# Patient Record
Sex: MALE | Race: WHITE
[De-identification: names, ages, dates, MRNs, and addresses within clinical notes are randomized per-mention and may not be internally consistent; named-entity substitution may affect disease eponyms.]

---

## 2021-08-21 ENCOUNTER — HOSPITAL ENCOUNTER (EMERGENCY)
Dept: HOSPITAL 41 - JD.ED | Age: 76
Discharge: NURSING, REHAB OR LONG-TERM CARE | End: 2021-08-21
Payer: MEDICARE

## 2021-08-21 DIAGNOSIS — I25.2: ICD-10-CM

## 2021-08-21 DIAGNOSIS — Z79.899: ICD-10-CM

## 2021-08-21 DIAGNOSIS — Z79.82: ICD-10-CM

## 2021-08-21 DIAGNOSIS — I47.2: Primary | ICD-10-CM

## 2021-08-21 DIAGNOSIS — Z20.822: ICD-10-CM

## 2021-08-21 DIAGNOSIS — I44.7: ICD-10-CM

## 2021-08-21 DIAGNOSIS — R55: ICD-10-CM

## 2021-08-21 DIAGNOSIS — I10: ICD-10-CM

## 2021-08-21 PROCEDURE — 85025 COMPLETE CBC W/AUTO DIFF WBC: CPT

## 2021-08-21 PROCEDURE — 96366 THER/PROPH/DIAG IV INF ADDON: CPT

## 2021-08-21 PROCEDURE — U0002 COVID-19 LAB TEST NON-CDC: HCPCS

## 2021-08-21 PROCEDURE — 99285 EMERGENCY DEPT VISIT HI MDM: CPT

## 2021-08-21 PROCEDURE — 96365 THER/PROPH/DIAG IV INF INIT: CPT

## 2021-08-21 PROCEDURE — 80053 COMPREHEN METABOLIC PANEL: CPT

## 2021-08-21 PROCEDURE — 84484 ASSAY OF TROPONIN QUANT: CPT

## 2021-08-21 PROCEDURE — 83735 ASSAY OF MAGNESIUM: CPT

## 2021-08-21 PROCEDURE — 85379 FIBRIN DEGRADATION QUANT: CPT

## 2021-08-21 PROCEDURE — 36415 COLL VENOUS BLD VENIPUNCTURE: CPT

## 2021-08-21 PROCEDURE — 93005 ELECTROCARDIOGRAM TRACING: CPT

## 2021-08-21 PROCEDURE — 96368 THER/DIAG CONCURRENT INF: CPT

## 2021-08-21 PROCEDURE — 71275 CT ANGIOGRAPHY CHEST: CPT

## 2021-08-21 RX ADMIN — Medication PRN ML: at 20:10

## 2021-08-21 RX ADMIN — Medication PRN ML: at 20:57
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
## 2021-08-22 NOTE — CT
CT chest

 

Technique: Multiple axial sections through the chest were obtained.  

Intravenous contrast was utilized.  Study has been performed as a 

pulmonary angiogram protocol.

 

Comparison: No prior chest imaging is available.

 

Findings: Pulmonary arteries are fairly well opacified.  No filling 

defects are seen to indicate pulmonary embolism.

 

Thoracic aorta shows atherosclerotic calcification with no aneurysm.  

Mediastinum shows no adenopathy.  No axillary adenopathy is seen.  

Soft tissue finding is seen posterior to the superior vena cava most 

likely representing a small vascular anomaly measuring 3.4 cm.

 

No pericardial thickening is seen.  Heart may be minimally enlarged.  

Increased density is seen within the gallbladder presumably 

representing gallstones.  Parapelvic cyst is partially seen within the

 right kidney.

 

Minimal hiatal hernia is noted.

 

Lung window settings were reviewed which show mild interstitial change

 which is most likely due to mild fibrosis.  No acute parenchymal 

change is definitely appreciated.

 

Bone window settings were reviewed.  Scattered degenerative change is 

seen within the spine.  No acute osseous finding is appreciated.

 

Impression:

1.  No findings of pulmonary embolism.

2.  Other findings as described above are believed to be nonacute.  

 

Diagnostic code #2

 

I agree with preliminary report from [facility], finalized on 08/21/21, 

10:26 PM CDT, Code 1

## 2021-09-02 ENCOUNTER — HOSPITAL ENCOUNTER (EMERGENCY)
Dept: HOSPITAL 41 - JD.ED | Age: 76
Discharge: HOME | End: 2021-09-02
Payer: MEDICARE

## 2021-09-02 DIAGNOSIS — R00.1: Primary | ICD-10-CM

## 2021-09-02 DIAGNOSIS — I25.10: ICD-10-CM

## 2021-09-02 DIAGNOSIS — R06.02: ICD-10-CM

## 2021-09-02 DIAGNOSIS — Z79.899: ICD-10-CM

## 2021-09-02 DIAGNOSIS — T46.2X5A: ICD-10-CM

## 2021-09-02 DIAGNOSIS — Z95.5: ICD-10-CM

## 2021-09-02 DIAGNOSIS — K21.9: ICD-10-CM

## 2021-09-02 DIAGNOSIS — I25.2: ICD-10-CM

## 2021-09-02 DIAGNOSIS — Z79.82: ICD-10-CM

## 2021-09-02 DIAGNOSIS — I10: ICD-10-CM

## 2021-09-02 PROCEDURE — 85730 THROMBOPLASTIN TIME PARTIAL: CPT

## 2021-09-02 PROCEDURE — 93005 ELECTROCARDIOGRAM TRACING: CPT

## 2021-09-02 PROCEDURE — 83735 ASSAY OF MAGNESIUM: CPT

## 2021-09-02 PROCEDURE — 85610 PROTHROMBIN TIME: CPT

## 2021-09-02 PROCEDURE — 71045 X-RAY EXAM CHEST 1 VIEW: CPT

## 2021-09-02 PROCEDURE — 99284 EMERGENCY DEPT VISIT MOD MDM: CPT

## 2021-09-02 PROCEDURE — 86140 C-REACTIVE PROTEIN: CPT

## 2021-09-02 PROCEDURE — 83880 ASSAY OF NATRIURETIC PEPTIDE: CPT

## 2021-09-02 PROCEDURE — 82553 CREATINE MB FRACTION: CPT

## 2021-09-02 PROCEDURE — 84484 ASSAY OF TROPONIN QUANT: CPT

## 2021-09-02 PROCEDURE — 85025 COMPLETE CBC W/AUTO DIFF WBC: CPT

## 2021-09-02 PROCEDURE — 36415 COLL VENOUS BLD VENIPUNCTURE: CPT

## 2021-09-02 PROCEDURE — 80053 COMPREHEN METABOLIC PANEL: CPT

## 2021-09-02 NOTE — EDM.PDOC
ED HPI GENERAL MEDICAL PROBLEM





- General


Chief Complaint: Cardiovascular Problem


Stated Complaint: HEART RATE IS DROPPING


Time Seen by Provider: 09/02/21 11:16


Source of Information: Reports: Patient, Family (Spouse)


History Limitations: Reports: No Limitations





- History of Present Illness


INITIAL COMMENTS - FREE TEXT/NARRATIVE: 


76-year-old male presents to the ED due to bradycardia.  Patient had 2 stents 

placed in the left anterior descending coronary artery by Dr. Joe 

cardiologist at [facility] in [place] on August 21.  Patient had a short 

run of V. tach while in hospital here ,August 20 which resolved on its own.  

Patient was to go to cardiac rehab first day today but as soon as they 

identified the bradycardia they sent him to the ED.  Patient was mildly lig

htheaded and dizzy with heart rates in the 30s.  He reduced his amiodarone today

from 400 mg daily to 200 mg daily which is the first day of treatment.  He is 

also on metoprolol succinate 50 mg daily.  He denies any chest pain or dyspnea. 

He overall feels well.  Of note the patient has any cardiac defibrillator vest 

which I have not seen before with pads bilateral lower back inferior to the 

scapula and a pad inferior to the left breast in a belt-like fashion.  

Apparently this was an option versus having a defibrillator pacemaker inserted. 

Patient denies any productive cough or sputum production.





Onset: Today (Heart rate in the 30s first noted today.  Blood pressure is 

actually been going up a bit over the last week.)


Onset Date: 09/02/21 (Bradycardia first noted today.)


Duration: Hour(s):, Waxing/Waning (Heart rate is staying primarily low 

bradycardia 52 to 54/min.)


Location: Reports: Other (Low heart rate i.e. bradycardia as low as 30/min.)


Quality: Reports: Other ([Low heart rate I bradycardia in the 30s.)


Improves with: Reports: Other (Proved spontaneously particularly at rest)


Worsens with: Reports: Other (Is worse with exertion and standing)


Associated Symptoms: Reports: Malaise, Shortness of Breath, Weakness (Mild since

 the MI.).  Denies: Confusion, Chest Pain, Cough, cough w sputum, Diaphoresis, 

Fever/Chills, Headaches, Loss of Appetite, Nausea/Vomiting, Rash, Seizure, 

Syncope (Mild)


Treatments PTA: Reports: Other (see below) (None.)





- Related Data


                                    Allergies











Allergy/AdvReac Type Severity Reaction Status Date / Time


 


No Known Allergies Allergy   Verified 09/02/21 11:04











Home Meds: 


                                    Home Meds





Aspirin 325 mg PO DAILY 08/21/21 [History]


Lisinopril/Hydrochlorothiazide [Lisinopril-HCTZ 10-12.5 MG] 2 tab PO DAILY 

08/21/21 [History]


Loratadine [Claritin] 10 mg PO DAILY PRN 08/21/21 [History]


Metoprolol Succinate 50 mg PO BID 08/21/21 [History]


Omeprazole 40 mg PO DAILY 08/21/21 [History]


Simvastatin [Zocor] 40 mg PO DAILY 08/21/21 [History]


flaxseed oiL [Flaxseed Oil] 2,400 mg PO BID 08/21/21 [History]


Metoprolol Succinate 25 mg PO DAILY #30 tab.er.24h 09/02/21 [Rx]











Past Medical History


HEENT History: Reports: Hard of Hearing, Impaired Vision


Cardiovascular History: Reports: Arrhythmia (History of recurrent bouts of V. 

tach.), CAD, Hypertension, MI, Stents (2 stents were placed August 21, 2021 in 

the left anterior descending artery.  Previous myocardial infarction in the 

inferior wall with an angioplasty performed 10 years ago.  No stent was placed 

at that time), Other (See Below)


Other Cardiovascular History: Vtach


Gastrointestinal History: Reports: Chronic Constipation, GERD (Patient problems 

with constipation.)


Genitourinary History: Reports: BPH





- Past Surgical History


GI Surgical History: Reports: Appendectomy, Hernia, Inguinal





Social & Family History





- Living Situation & Occupation


Living situation: Reports: , with Spouse


Occupation: Retired





ED ROS GENERAL





- Review of Systems


Review Of Systems: See Below


Constitutional: Reports: Malaise, Fatigue, Decreased Appetite.  Denies: Fever, 

Chills, Weight Loss


HEENT: Reports: Glasses (And a decreased appetite.)


Respiratory: Reports: Shortness of Breath, Cough.  Denies: Wheezing, Pleuritic 

Chest Pain, Sputum, Hemoptysis (Productive cough intermittently.)


Cardiovascular: Reports: Blood Pressure Problem (Pressures been gradually), 

Lightheadedness (Today with low heart rate).  Denies: Chest Pain, Claudication (

going up the last week or so.), Dyspnea on Exertion, Edema, Orthopnea, 

Palpitations


Endocrine: Reports: Fatigue


GI/Abdominal: Reports: Decreased Appetite


: Reports: Frequency, Other (Nocturia usually x1 or 2.)


Musculoskeletal: Reports: Joint Pain (Right his knees hips low back neck at 

times)


Skin: Reports: Bruising (Is is easily.)


Neurological: Reports: No Symptoms.  Denies: Confusion, Dizziness, Headache, 

Numbness, Syncope, Tingling, Difficulty Walking, Weakness


Psychiatric: Reports: No Symptoms


Hematologic/Lymphatic: Reports: No Symptoms


Immunologic: Reports: No Symptoms





ED EXAM, GENERAL





- Physical Exam


Exam: See Below


Exam Limited By: No Limitations


General Appearance: Alert, WD/WN, Anxious, Mild Distress, Other (Temperature is 

36.9 degrees.  Heart rate is 54 and sinus bradycardia respiratory to 16 with O2 

sats of 90% room air.  /85.)


Eye Exam: Bilateral Eye: Normal Inspection (No blepharal pallor or scleral 

icterus.), PERRL


Throat/Mouth: Normal Inspection, Normal Lips, Normal Oropharynx


Head: Atraumatic, Normocephalic


Neck: Normal Inspection, Supple, Non-Tender, Full Range of Motion.  No: Carotid 

Bruit, Lymphadenopathy (L), Lymphadenopathy (R)


Respiratory/Chest: No Respiratory Distress, Lungs Clear, Normal Breath Sounds, 

No Accessory Muscle Use


Cardiovascular: No Edema, No Gallop, No Murmur, No Rub, Bradycardia (Rate was 

48/min when I was in the room.).  No: Normal Peripheral Pulses, Regular Rate, 

Rhythm


Peripheral Pulses: 2+: Carotid (L), Carotid (R), Posterior Tibial (L), Posterior

Tibial (R), Dorsalis Pedis (L), Dorsalis Pedis (R)


GI/Abdominal: Normal Bowel Sounds, Soft, Non-Tender, No Organomegaly, No Mass, 

Pelvis Stable


Back Exam: Normal Inspection, Full Range of Motion.  No: CVA Tenderness (L), CVA

Tenderness (R)


Extremities: Normal Inspection, Normal Range of Motion, Non-Tender, No Pedal 

Edema


Neurological: Alert, Oriented, CN II-XII Intact, Normal Cognition


Psychiatric: Normal Affect, Anxious


Skin Exam: Warm, Dry, Intact (Mildly anxious), Normal Color, No Rash


  ** #1 Interpretation


EKG Date: 09/02/21


Time: 10:57


Rhythm: Other (Sinus bradycardia)


Rate (Beats/Min): 53


Axis: LAD-Left Axis Deviation (-30 degrees)


P-Wave: Present (With first-degree AV block)


QRS: LBBB


QT: Prolonged (Left bundle branch block pattern moderately prolonged)


EKG Interpretation Comments: 





Abnormal ECG





Course





- Vital Signs


Last Recorded V/S: 


                                Last Vital Signs











Temp  36.9 C   09/02/21 10:58


 


Pulse  56 L  09/02/21 10:58


 


Resp  16   09/02/21 10:58


 


BP  144/85 H  09/02/21 10:58


 


Pulse Ox  98   09/02/21 10:58














- Orders/Labs/Meds


Labs: 


                                Laboratory Tests











  09/02/21 09/02/21 09/02/21 Range/Units





  11:00 11:00 11:00 


 


WBC  8.48    (4.23-9.07)  K/mm3


 


RBC  5.07    (4.63-6.08)  M/mm3


 


Hgb  16.1    (13.7-17.5)  gm/dl


 


Hct  49.6    (40.1-51.0)  %


 


MCV  97.8 H    (79.0-92.2)  fl


 


MCH  31.8    (25.7-32.2)  pg


 


MCHC  32.5    (32.2-35.5)  g/dl


 


RDW Std Deviation  45.7 H    (35.1-43.9)  fL


 


Plt Count  326  D    (163-337)  K/mm3


 


MPV  10.1    (9.4-12.3)  fl


 


Neut % (Auto)  60.1    (34.0-67.9)  %


 


Lymph % (Auto)  24.2    (21.8-53.1)  %


 


Mono % (Auto)  12.9 H    (5.3-12.2)  %


 


Eos % (Auto)  1.2    (0.8-7.0)  


 


Baso % (Auto)  0.5    (0.1-1.2)  %


 


Neut # (Auto)  5.11    (1.78-5.38)  K/mm3


 


Lymph # (Auto)  2.05    (1.32-3.57)  K/mm3


 


Mono # (Auto)  1.09 H    (0.30-0.82)  K/mm3


 


Eos # (Auto)  0.10    (0.04-0.54)  K/mm3


 


Baso # (Auto)  0.04    (0.01-0.08)  K/mm3


 


PT   11.9   (9.7-12.0)  SECONDS


 


INR   1.11   


 


APTT     (21.7-31.4)  SECONDS


 


Sodium    141  (136-145)  mEq/L


 


Potassium    4.2  (3.5-5.1)  mEq/L


 


Chloride    104  ()  mEq/L


 


Carbon Dioxide    29  (21-32)  mEq/L


 


Anion Gap    12.2  (5-15)  


 


BUN    27 H  (7-18)  mg/dL


 


Creatinine    1.2  (0.7-1.3)  mg/dL


 


Est Cr Clr Drug Dosing    66.31  mL/min


 


Estimated GFR (MDRD)    59  (>60)  mL/min


 


BUN/Creatinine Ratio    22.5 H  (14-18)  


 


Glucose    71  (70-99)  mg/dL


 


Calcium    8.7  (8.5-10.1)  mg/dL


 


Magnesium    2.2  (1.8-2.4)  mg/dL


 


Total Bilirubin    1.2 H  (0.2-1.0)  mg/dL


 


AST    9 L  (15-37)  U/L


 


ALT    28  (16-63)  U/L


 


Alkaline Phosphatase    61  ()  U/L


 


CK-MB (CK-2)    1.2  (0-3.6)  ng/ml


 


Troponin I    < 0.017  (0.00-0.056)  ng/mL


 


C-Reactive Protein    <0.2  (<1.0)  mg/dL


 


NT-Pro-B Natriuret Pep     (0-125)  pg/mL


 


Total Protein    7.5  (6.4-8.2)  g/dl


 


Albumin    3.7  (3.4-5.0)  g/dl


 


Globulin    3.8  gm/dL


 


Albumin/Globulin Ratio    1.0  (1-2)  














  09/02/21 09/02/21 Range/Units





  11:00 11:00 


 


WBC    (4.23-9.07)  K/mm3


 


RBC    (4.63-6.08)  M/mm3


 


Hgb    (13.7-17.5)  gm/dl


 


Hct    (40.1-51.0)  %


 


MCV    (79.0-92.2)  fl


 


MCH    (25.7-32.2)  pg


 


MCHC    (32.2-35.5)  g/dl


 


RDW Std Deviation    (35.1-43.9)  fL


 


Plt Count    (163-337)  K/mm3


 


MPV    (9.4-12.3)  fl


 


Neut % (Auto)    (34.0-67.9)  %


 


Lymph % (Auto)    (21.8-53.1)  %


 


Mono % (Auto)    (5.3-12.2)  %


 


Eos % (Auto)    (0.8-7.0)  


 


Baso % (Auto)    (0.1-1.2)  %


 


Neut # (Auto)    (1.78-5.38)  K/mm3


 


Lymph # (Auto)    (1.32-3.57)  K/mm3


 


Mono # (Auto)    (0.30-0.82)  K/mm3


 


Eos # (Auto)    (0.04-0.54)  K/mm3


 


Baso # (Auto)    (0.01-0.08)  K/mm3


 


PT    (9.7-12.0)  SECONDS


 


INR    


 


APTT  26.7   (21.7-31.4)  SECONDS


 


Sodium    (136-145)  mEq/L


 


Potassium    (3.5-5.1)  mEq/L


 


Chloride    ()  mEq/L


 


Carbon Dioxide    (21-32)  mEq/L


 


Anion Gap    (5-15)  


 


BUN    (7-18)  mg/dL


 


Creatinine    (0.7-1.3)  mg/dL


 


Est Cr Clr Drug Dosing    mL/min


 


Estimated GFR (MDRD)    (>60)  mL/min


 


BUN/Creatinine Ratio    (14-18)  


 


Glucose    (70-99)  mg/dL


 


Calcium    (8.5-10.1)  mg/dL


 


Magnesium    (1.8-2.4)  mg/dL


 


Total Bilirubin    (0.2-1.0)  mg/dL


 


AST    (15-37)  U/L


 


ALT    (16-63)  U/L


 


Alkaline Phosphatase    ()  U/L


 


CK-MB (CK-2)    (0-3.6)  ng/ml


 


Troponin I    (0.00-0.056)  ng/mL


 


C-Reactive Protein    (<1.0)  mg/dL


 


NT-Pro-B Natriuret Pep   212 H  (0-125)  pg/mL


 


Total Protein    (6.4-8.2)  g/dl


 


Albumin    (3.4-5.0)  g/dl


 


Globulin    gm/dL


 


Albumin/Globulin Ratio    (1-2)  











Meds: 


Medications














Discontinued Medications














Generic Name Dose Route Start Last Admin





  Trade Name Freq  PRN Reason Stop Dose Admin


 


Sodium Chloride  1,000 mls @ 100 mls/hr  09/02/21 11:30  09/02/21 12:32





  Normal Saline  IV   100 mls/hr





  ASDIRECTED [facility]   Administration














- Radiology Interpretation


Free Text/Narrative:: 


76-year-old male presents to the ED in the accompaniment of his wife. Patient 

was supposed to attend cardiac rehab for the first time today. They appreciated 

his rate was in the 30s while upon arrival. He was thus sent to the ED. Wife 

reports heart rate has been as low as 30 and was 38 when I first visit with the 

patient and since then it has been staying in the mid to lower 50s. Patient had 

a run of V. tach with suspected myocardial infarction on August 20. He was seen 

in [place] and had 2 stents placed in his left anterior descending artery 

August 21. Due to runs of V. tach he is currently on a defibrillator vest which 

I have not seen before. Apparently he was not sure he wanted to proceed with a 

defibrillator pacemaker insertion. He has recently reduced his amiodarone from 

400 mg daily to 200 mg daily and today is the first day that this is occurred. 

He is on metoprolol succinate 50 mg daily as well. He denies chest pain feels 

mildly short of breath mildly lightheaded at times but not orthostatic. In fact 

blood pressure if anything is been going up a bit the last few days. Plan ECG. 

Portable chest x-ray. Routine labs to be done.








- Re-Assessments/Exams


Free Text/Narrative Re-Assessment/Exam: 





09/02/21 12:30: Moderate cardiomegaly with prominent tortuous thoracic aorta. 

Minimal linear density within the right lung base is most likely chronic. He has

a dromedary hump of the right hemidiaphragm. Lungs otherwise are clear. Bony 

structures showed degenerative change within the thoracic spine. No acute 

osseous abnormalities appreciated.





09/02/21 12:47 White count is 8.48 with 60% neutrophils on the auto 

differential. Hemoglobin is 16.1 with hematocrit of 49.6. MCV is 97.8. Platelet 

count 326,000. PT is 11.9 with an INR of 1.11. PTT is 26.7. Sodium 141 with a 

potassium of 4.2. Chloride 104 the bicarb of 29. Anion gap is 12.2. BUN is 27 

with a creatinine of 1.2 and a GFR of 59. Glucose is low at 71. Calcium 8.7 with

a magnesium of 2.2. Total bilirubin is 1.2 with an AST of 9 and an ALT of 28. 

Alkaline phosphatase is 61. CK-MB fraction is 1.2 troponin I is less than 0.017.

C-reactive protein less than 0.2. BNP is 212. Total protein is 7.5 with an 

albumin fraction of 3.7 


09/02/21 13:00: I did speak with on-call cardiologist Dr. Way and he agrees 

that the patient requires a reduction in dosage of medication.  Since his 

amiodarone was reduced just today to 200 mg twice daily from 400 mg twice daily 

after receiving adequate loading dose that we will in turn reduce his metoprolol

succinate from 50 mg to 25 mg once daily starting tomorrow morning.  They have a

follow-up appointment with Dr. Joe next week.














Departure





- Departure


Time of Disposition: 13:31


Disposition: Home, Self-Care 01


Reason for Transfer *Q: Other


Condition: Fair


Clinical Impression: 


 Symptomatic bradycardia





Adverse effects of medication


Qualifiers:


 Encounter type: initial encounter Qualified Code(s): T50.905A - Adverse effect 

of unspecified drugs, medicaments and biological substances, initial encounter





Prescriptions: 


Metoprolol Succinate 25 mg PO DAILY #30 tab.er.24h


Instructions:  Bradycardia, Adult


Referrals: 


Gerber Miller MD [Primary Care Provider] - 


Forms:  ED Department Discharge


Additional Instructions: 


Evaluation in the emergency room today in regards to presentation to cardiac 

rehab with identification of a very low heart rate in the low 30s.  While you 

were in the emergency room your heart rate is staying in the mid 50s which is 

kind of where we would like it usually between 55 and 65 bpm.  Lab work done 

through the emergency room was all within normal limits today showing a normal 

magnesium and potassium level.  Chest x-ray will also was essentially normal.  

Decision made after speaking with cardiology in [facility] in [place] is 

to reduce your metoprolol succinate from 50 mg daily to 25 mg daily and let your

heart rate come back up.  Of note this will take at least 3 to 4 days for the 

higher dose to wear off.  Therefore take life easy over the next 3 to 4 days 

with no prolonged standing which would place you at risk of a fall.  Maintain 

adequate hydration as well.  Start the metoprolol 25 mg succinate tablet 

tomorrow morning.  Continue with your amiodarone reduction to 200 mg tablet 

twice daily as planned.

## 2021-09-02 NOTE — CR
Chest: Frontal view of the chest was obtained.

 

Comparison: Prior chest CT of 08/21/21.

 

Heart size and mediastinum are within normal limits.  Minimal linear 

density within the right lung base is seen most likely chronic.  Lungs

 otherwise are clear.  Bony structures shows degenerative change 

within the spine.  No acute osseous abnormality is appreciated.

 

Impression:

1.  Incidental findings.

2.  Nothing acute is appreciated on frontal chest x-ray.

 

Diagnostic code #2

## 2022-11-23 ENCOUNTER — HOSPITAL ENCOUNTER (OUTPATIENT)
Dept: HOSPITAL 41 - JD.SDS | Age: 77
Discharge: HOME | End: 2022-11-23
Attending: SURGERY
Payer: MEDICARE

## 2022-11-23 DIAGNOSIS — K21.9: ICD-10-CM

## 2022-11-23 DIAGNOSIS — I11.0: ICD-10-CM

## 2022-11-23 DIAGNOSIS — N40.0: ICD-10-CM

## 2022-11-23 DIAGNOSIS — I44.7: ICD-10-CM

## 2022-11-23 DIAGNOSIS — E78.00: ICD-10-CM

## 2022-11-23 DIAGNOSIS — Z90.49: ICD-10-CM

## 2022-11-23 DIAGNOSIS — Z79.899: ICD-10-CM

## 2022-11-23 DIAGNOSIS — K44.9: ICD-10-CM

## 2022-11-23 DIAGNOSIS — I42.0: ICD-10-CM

## 2022-11-23 DIAGNOSIS — I25.2: ICD-10-CM

## 2022-11-23 DIAGNOSIS — R13.10: Primary | ICD-10-CM

## 2022-11-23 DIAGNOSIS — I50.22: ICD-10-CM

## 2022-11-23 DIAGNOSIS — K29.70: ICD-10-CM

## 2022-11-23 DIAGNOSIS — Z98.890: ICD-10-CM

## 2022-11-23 DIAGNOSIS — I25.10: ICD-10-CM

## 2022-11-23 DIAGNOSIS — Z79.82: ICD-10-CM

## 2022-11-23 DIAGNOSIS — K29.50: ICD-10-CM
